# Patient Record
Sex: MALE
[De-identification: names, ages, dates, MRNs, and addresses within clinical notes are randomized per-mention and may not be internally consistent; named-entity substitution may affect disease eponyms.]

---

## 2017-04-01 ENCOUNTER — HOSPITAL ENCOUNTER (EMERGENCY)
Dept: HOSPITAL 14 - H.ER | Age: 37
Discharge: HOME | End: 2017-04-01
Payer: COMMERCIAL

## 2017-04-01 VITALS — HEART RATE: 64 BPM | DIASTOLIC BLOOD PRESSURE: 90 MMHG | RESPIRATION RATE: 18 BRPM | SYSTOLIC BLOOD PRESSURE: 119 MMHG

## 2017-04-01 VITALS — OXYGEN SATURATION: 100 %

## 2017-04-01 VITALS — TEMPERATURE: 98.7 F

## 2017-04-01 DIAGNOSIS — R00.2: Primary | ICD-10-CM

## 2017-04-01 LAB
ALBUMIN/GLOB SERPL: 1.1 {RATIO} (ref 1–2.1)
ALP SERPL-CCNC: 97 U/L (ref 38–126)
ALT SERPL-CCNC: 50 U/L (ref 21–72)
AST SERPL-CCNC: 35 U/L (ref 17–59)
BASOPHILS # BLD AUTO: 0.1 K/UL (ref 0–0.2)
BASOPHILS NFR BLD: 0.8 % (ref 0–2)
BILIRUB SERPL-MCNC: 0.7 MG/DL (ref 0.2–1.3)
BUN SERPL-MCNC: 13 MG/DL (ref 9–20)
CALCIUM SERPL-MCNC: 9.4 MG/DL (ref 8.4–10.2)
CHLORIDE SERPL-SCNC: 105 MMOL/L (ref 98–107)
CO2 SERPL-SCNC: 25 MMOL/L (ref 22–30)
EOSINOPHIL # BLD AUTO: 0.2 K/UL (ref 0–0.7)
EOSINOPHIL NFR BLD: 2.2 % (ref 0–4)
ERYTHROCYTE [DISTWIDTH] IN BLOOD BY AUTOMATED COUNT: 13.9 % (ref 11.5–14.5)
ETHANOL SERPL-MCNC: < 10 MG/DL (ref 0–10)
GLOBULIN SER-MCNC: 3.9 GM/DL (ref 2.2–3.9)
GLUCOSE SERPL-MCNC: 94 MG/DL (ref 75–110)
HCT VFR BLD CALC: 50.8 % (ref 35–51)
LYMPHOCYTES # BLD AUTO: 3.6 K/UL (ref 1–4.3)
LYMPHOCYTES NFR BLD AUTO: 32.3 % (ref 20–40)
MCH RBC QN AUTO: 29.8 PG (ref 27–31)
MCHC RBC AUTO-ENTMCNC: 32.9 G/DL (ref 33–37)
MCV RBC AUTO: 90.7 FL (ref 80–94)
MONOCYTES # BLD: 0.9 K/UL (ref 0–0.8)
MONOCYTES NFR BLD: 8 % (ref 0–10)
NEUTROPHILS # BLD: 6.3 K/UL (ref 1.8–7)
NEUTROPHILS NFR BLD AUTO: 56.7 % (ref 50–75)
NRBC BLD AUTO-RTO: 0.1 % (ref 0–0)
PLATELET # BLD: 276 K/UL (ref 130–400)
PMV BLD AUTO: 8.9 FL (ref 7.2–11.7)
POTASSIUM SERPL-SCNC: 3.9 MMOL/L (ref 3.6–5)
PROT SERPL-MCNC: 8.5 G/DL (ref 6.3–8.2)
SODIUM SERPL-SCNC: 145 MMOL/L (ref 132–148)
WBC # BLD AUTO: 11 K/UL (ref 4.8–10.8)

## 2017-04-01 NOTE — CARD
--------------- APPROVED REPORT --------------





EKG Measurement

Heart [id number]

NM 142P18

[id number]

[id number]

[id number]



<Conclusion>

Normal sinus rhythm

Normal ECG

## 2017-04-01 NOTE — ED PDOC
- Laboratory Results


Result Diagrams: 


 04/01/17 13:55





 04/01/17 13:55





- ECG


O2 Sat by Pulse Oximetry: 100 (RA)


Pulse Ox Interpretation: Normal





Medical Decision Making


Medical Decision Making: 


Time: 15:00


--Patient is pending ER work up, reassessment, and final disposition. 





330


On reeval pt stable. Labs unremarkable. DW pt findings and plan of care. Avoid 

all types of energy drinks and excessive alcohol/caffeine. F/U clinic.





Disposition


Counseled Patient/Family Regarding: Studies Performed, Diagnosis, Need For 

Followup





- Clinical Impression


Clinical Impression: 


 Palpitations





- POA


Present On Arrival: None





- Disposition


Referrals: 


 Service [Outside]


Disposition: Routine/Home


Disposition Time: 15:30


Condition: STABLE


Additional Instructions: 


VISIT YOUR DOCTOR IN 2-3 DAYS FOR REEVALUATION





DO NOT DRINK ANY ENERGY DRINKS. AVOID EXCESS CAFFEINE OR ALCOHOL.


Instructions:  Palpitations (ED)


Print Language: Chinese

## 2017-04-01 NOTE — ED PDOC
HPI: SOB/CHF/COPD


Time Seen by Provider: 04/01/17 13:30


Chief Complaint (Nursing): Shortness Of Breath


Chief Complaint (Provider): Shortness Of Breath


History Per: Patient


History/Exam Limitations: no limitations


Onset/Duration Of Symptoms: Days (x2 weeks)


Current Symptoms Are (Timing): Still Present


Additional Complaint(s): 


37 y/o male who presents to the emergency department with a complaint 

palpitations x2 weeks after drinking redbull. Associated with shortness of 

breath. Denies nausea, vomiting, diarrhea, vision changes, dizziness, abdominal 

pain, or leg pain.  No chest pain, dizziness, headaches, drugs, calf pain, fever

, long distance travel, weakness.  Has had similar in the past for a long time 

frequently. 





Past Medical History


Reviewed: Historical Data, Nursing Documentation, Vital Signs


Vital Signs: 


 Last Vital Signs











Temp  98.7 F   04/01/17 13:22


 


Pulse  69   04/01/17 13:22


 


Resp  16   04/01/17 13:41


 


BP  136/99 H  04/01/17 13:22


 


Pulse Ox  100   04/01/17 13:49














- Medical History


PMH: No Chronic Diseases





- Surgical History


Surgical History: No Surg Hx





- Family History


Family History: States: Unknown Family Hx





- Living Arrangements


Living Arrangements: With Family





- Social History


Current smoker - smoking cessation education provided: No


Alcohol: Social


Drugs: Denies





- Allergies


Allergies/Adverse Reactions: 


 Allergies











Allergy/AdvReac Type Severity Reaction Status Date / Time


 


No Known Allergies Allergy   Verified 04/01/17 13:22














Review of Systems


ROS Statement: Except As Marked, All Systems Reviewed And Found Negative


Eyes: Negative for: Vision Change


Cardiovascular: Positive for: Palpitations


Respiratory: Positive for: Shortness of Breath


Gastrointestinal: Negative for: Nausea, Vomiting, Abdominal Pain, Diarrhea


Musculoskeletal: Negative for: Leg Pain


Neurological: Negative for: Headache, Dizziness





Physical Exam





- Reviewed


Nursing Documentation Reviewed: Yes


Vital Signs Reviewed: Yes





- Physical Exam


Appears: Positive for: Non-toxic, No Acute Distress


Head Exam: Positive for: ATRAUMATIC, NORMOCEPHALIC


Skin: Positive for: Normal Color, Warm, Dry


Eye Exam: Positive for: Normal appearance.  Negative for: Conjunctival injection


ENT: Positive for: Normal ENT Inspection.  Negative for: Nasal Congestion


Neck: Positive for: Normal, Painless ROM, Supple


Cardiovascular/Chest: Positive for: Regular Rate, Rhythm.  Negative for: Chest 

Non Tender, Edema, Murmur


Respiratory: Positive for: Normal Breath Sounds.  Negative for: Accessory 

Muscle Use, Respiratory Distress


Gastrointestinal/Abdominal: Positive for: Normal Exam, Soft.  Negative for: 

Tenderness


Back: Positive for: Normal Inspection.  Negative for: L CVA Tenderness, R CVA 

Tenderness


Extremity: Positive for: Normal ROM.  Negative for: Tenderness, Pedal Edema, 

Swelling


Neurologic/Psych: Positive for: Alert, CNs II-XII, Oriented.  Negative for: 

Motor/Sensory Deficits





- Laboratory Results


Result Diagrams: 


 04/01/17 13:55








- ECG


ECG: Positive for: Interpreted By Me, Viewed By Me


ECG Rhythm: Positive for: Normal QRS, Normal ST Segment, Sinus Rhythm


O2 Sat by Pulse Oximetry: 100 (RA)


Pulse Ox Interpretation: Normal





- Progress


ED Course And Treament: 


1449:  Stable.  Dr. Calle to fu on labs.  AAOx3.





Medical Decision Making


Medical Decision Making: 


Time: 13:30


Initial impression: Shortness of breath





Initial plan:


--Electrocardiogram Stat


--Alcohol Serum Stat


--COMP Metabolic Panel


--Drug Screen, Urine Stat


--Troponin I stat


--EKG-ED (EDNURTX)


--CBC w/ differential


--Sodium Chloride 1,000 ml IV 1,000 mls/hr


--Revaluation








Scribe Attestation:


Documented by Giselle Pruitt, acting as a scribe for Jhonatan Ferrer MD.





Provider Scribe Attestation:


All medical record entries made by the Scribe were at my direction and 

personally dictated by me. I have reviewed the chart and agree that the record 

accurately reflects my personal performance of the history, physical exam, 

medical decision making, and the department course for this patient. I have 

also personally directed, reviewed, and agree with the discharge instructions 

and disposition.





Disposition





- Clinical Impression


Clinical Impression: 


 Palpitations





- Patient ED Disposition


Is Patient to be Admitted: Transfer of Care





- Disposition


Disposition: Transfer of Care


Disposition Time: 14:51


Condition: STABLE


Patient Signed Over To: Sindhu Calle


Present On Arrival: None

## 2018-09-30 ENCOUNTER — HOSPITAL ENCOUNTER (EMERGENCY)
Dept: HOSPITAL 14 - H.ER | Age: 38
Discharge: HOME | End: 2018-09-30
Payer: COMMERCIAL

## 2018-09-30 VITALS — SYSTOLIC BLOOD PRESSURE: 130 MMHG | TEMPERATURE: 97.6 F | DIASTOLIC BLOOD PRESSURE: 78 MMHG | HEART RATE: 78 BPM

## 2018-09-30 VITALS — OXYGEN SATURATION: 98 %

## 2018-09-30 VITALS — RESPIRATION RATE: 19 BRPM

## 2018-09-30 DIAGNOSIS — R00.2: Primary | ICD-10-CM

## 2018-09-30 LAB
APTT BLD: 29.5 SECONDS (ref 25.6–37.1)
BASOPHILS # BLD AUTO: 0.1 K/UL (ref 0–0.2)
BASOPHILS NFR BLD: 0.7 % (ref 0–2)
BUN SERPL-MCNC: 14 MG/DL (ref 9–20)
CALCIUM SERPL-MCNC: 9.5 MG/DL (ref 8.4–10.2)
EOSINOPHIL # BLD AUTO: 0.1 K/UL (ref 0–0.7)
EOSINOPHIL NFR BLD: 0.6 % (ref 0–4)
ERYTHROCYTE [DISTWIDTH] IN BLOOD BY AUTOMATED COUNT: 13.9 % (ref 11.5–14.5)
GFR NON-AFRICAN AMERICAN: > 60
HGB BLD-MCNC: 16.6 G/DL (ref 12–18)
INR PPP: 1.1
LYMPHOCYTES # BLD AUTO: 2.4 K/UL (ref 1–4.3)
LYMPHOCYTES NFR BLD AUTO: 21.3 % (ref 20–40)
MCH RBC QN AUTO: 30.4 PG (ref 27–31)
MCHC RBC AUTO-ENTMCNC: 33.7 G/DL (ref 33–37)
MCV RBC AUTO: 90.3 FL (ref 80–94)
MONOCYTES # BLD: 1 K/UL (ref 0–0.8)
MONOCYTES NFR BLD: 8.7 % (ref 0–10)
NEUTROPHILS # BLD: 7.8 K/UL (ref 1.8–7)
NEUTROPHILS NFR BLD AUTO: 68.7 % (ref 50–75)
NRBC BLD AUTO-RTO: 0.1 % (ref 0–0)
PLATELET # BLD: 312 K/UL (ref 130–400)
PMV BLD AUTO: 9.3 FL (ref 7.2–11.7)
PROTHROMBIN TIME: 12.4 SECONDS (ref 9.8–13.1)
RBC # BLD AUTO: 5.47 MIL/UL (ref 4.4–5.9)
WBC # BLD AUTO: 11.3 K/UL (ref 4.8–10.8)

## 2018-09-30 PROCEDURE — 99285 EMERGENCY DEPT VISIT HI MDM: CPT

## 2018-09-30 PROCEDURE — 80324 DRUG SCREEN AMPHETAMINES 1/2: CPT

## 2018-09-30 PROCEDURE — 80345 DRUG SCREENING BARBITURATES: CPT

## 2018-09-30 PROCEDURE — 80346 BENZODIAZEPINES1-12: CPT

## 2018-09-30 PROCEDURE — 71046 X-RAY EXAM CHEST 2 VIEWS: CPT

## 2018-09-30 PROCEDURE — 93005 ELECTROCARDIOGRAM TRACING: CPT

## 2018-09-30 PROCEDURE — 86900 BLOOD TYPING SEROLOGIC ABO: CPT

## 2018-09-30 PROCEDURE — 85730 THROMBOPLASTIN TIME PARTIAL: CPT

## 2018-09-30 PROCEDURE — 85610 PROTHROMBIN TIME: CPT

## 2018-09-30 PROCEDURE — 80353 DRUG SCREENING COCAINE: CPT

## 2018-09-30 PROCEDURE — 80358 DRUG SCREENING METHADONE: CPT

## 2018-09-30 PROCEDURE — 80349 CANNABINOIDS NATURAL: CPT

## 2018-09-30 PROCEDURE — 86850 RBC ANTIBODY SCREEN: CPT

## 2018-09-30 PROCEDURE — 96374 THER/PROPH/DIAG INJ IV PUSH: CPT

## 2018-09-30 PROCEDURE — 84484 ASSAY OF TROPONIN QUANT: CPT

## 2018-09-30 PROCEDURE — 80320 DRUG SCREEN QUANTALCOHOLS: CPT

## 2018-09-30 PROCEDURE — 96375 TX/PRO/DX INJ NEW DRUG ADDON: CPT

## 2018-09-30 PROCEDURE — 85025 COMPLETE CBC W/AUTO DIFF WBC: CPT

## 2018-09-30 PROCEDURE — 83992 ASSAY FOR PHENCYCLIDINE: CPT

## 2018-09-30 PROCEDURE — 71275 CT ANGIOGRAPHY CHEST: CPT

## 2018-09-30 PROCEDURE — 80361 OPIATES 1 OR MORE: CPT

## 2018-09-30 PROCEDURE — 80048 BASIC METABOLIC PNL TOTAL CA: CPT

## 2018-09-30 NOTE — CT
Date of service: 



09/30/2018



PROCEDURE:  CT Chest with contrast (Pulmonary Angiogram)



HISTORY:

chest pain



COMPARISON:

None available.



TECHNIQUE:

Axial computed tomography images were obtained of the chest in the 

pulmonary arterial phase of enhancement. Coronal and sagittal 

reformatted images were created and reviewed.



Intravenous contrast dose: 98 mL Visipaque 320



Radiation dose:



Total exam DLP = 347.2 mGy-cm.



This CT exam was performed using one or more of the following dose 

reduction techniques: Automated exposure control, adjustment of the 

mA and/or kV according to patient size, and/or use of iterative 

reconstruction technique.



FINDINGS:



PULMONARY ARTERIES:

Unremarkable. No pulmonary embolism. 



AORTA:

No acute findings. No thoracic aortic aneurysm. 



LUNGS:

Unremarkable. No nodule, mass or pulmonary consolidation. 



PLEURAL SPACES:

Unremarkable. No effusion or pneumothorax. 



HEART:

Unremarkable. No cardiomegaly. No significant pericardial effusion. 



LYMPH NODES:

No lymphadenopathy.



BONES, CHEST WALL:

Unremarkable. No fracture or destructive lesion 



OTHER FINDINGS:

Unremarkable. 



IMPRESSION:

Unremarkable CT pulmonary angiogram. No pulmonary embolus.

## 2018-09-30 NOTE — ED PDOC
- Laboratory Results


Result Diagrams: 


                                 09/30/18 06:41





                                 09/30/18 06:41





- ECG


O2 Sat by Pulse Oximetry: 98 (RA)





Medical Decision Making


Medical Decision Making: 





recd 7am pending labs


38yo M with palpitations and chest tightness.


EKG reveals sinus with ectopy and frequent bigeminy. 








Accession No. : [id number]


Patient Name / ID : SAQIB BERNAL  / [id number]


Exam Date : 09/30/2018 14:57:07 ( Approved )


Study Comment : 


Sex / Age : M  / 37Y





Creator : Tiago Lopez MD


Dictator : Tiago Lopez MD


 : 


Approver : Tiago Lopez MD


Approver2 : 





Report Date : 09/30/2018 15:42:30


My Comment : 


 

********************************************************************************


***





This report is currently processing and HAS NOT BEEN OFFICIALLY SIGNED BY THE 

PHYSICIAN - ESTIMATED TIME OF APPROVAL IS 09/30/2018 15:51. 





Date of service: 





09/30/2018





PROCEDURE:  CT Chest with contrast (Pulmonary Angiogram)





HISTORY:


chest pain





COMPARISON:


None available.





TECHNIQUE:


Axial computed tomography images were obtained of the chest in the pulmonary 

arterial phase of enhancement. Coronal and sagittal reformatted images were 

created and reviewed.





Intravenous contrast dose: 98 mL Visipaque 320





Radiation dose:





Total exam DLP = 347.2 mGy-cm.





This CT exam was performed using one or more of the following dose reduction 

techniques: Automated exposure control, adjustment of the mA and/or kV according

to patient size, and/or use of iterative reconstruction technique.





FINDINGS:





PULMONARY ARTERIES:


Unremarkable. No pulmonary embolism. 





AORTA:


No acute findings. No thoracic aortic aneurysm. 





LUNGS:


Unremarkable. No nodule, mass or pulmonary consolidation. 





PLEURAL SPACES:


Unremarkable. No effusion or pneumothorax. 





HEART:


Unremarkable. No cardiomegaly. No significant pericardial effusion. 





LYMPH NODES:


No lymphadenopathy.





BONES, CHEST WALL:


Unremarkable. No fracture or destructive lesion 





OTHER FINDINGS:


Unremarkable. 





IMPRESSION:


Unremarkable CT pulmonary angiogram. No pulmonary embolus.








repeat ekg #3 sinus with APCs, trace ST elev V3 but consistent w 2017 EKG


No chest pain.


Symptoms ongoing 3 weeks


HEART score 0





DC w metoprolol 25mg BID, refer to cardio


Drug avoidance








Disposition


Counseled Patient/Family Regarding: Studies Performed, Diagnosis, Need For 

Followup





- Clinical Impression


Clinical Impression: 


 Palpitations








- POA


Present On Arrival: None





- Disposition


Referrals: 


Jamison Mims MD [Staff Provider] - 


Disposition: Routine/Home


Disposition Time: 15:59


Condition: STABLE


Additional Instructions: 


See cardiologist for further testing. 


Return to ER for any new or worsening symptoms.


Take metoprolol 25mg 2x daily.


Avoid all drug use.








Prescriptions: 


Metoprolol Tartrate 25 mg PO BID #10 tablet


Instructions:  Palpitations (DC)


Forms:  [facility] (English)

## 2018-09-30 NOTE — CARD
--------------- APPROVED REPORT --------------





Date of service: 09/30/2018



EKG Measurement

Heart [id number]

MD 232P12

[id number]

[id number]

[id number]



<Conclusion>

Sinus rhythm with 1st degree AV block with premature atrial complexes 

in a pattern of bigeminy

Otherwise normal ECG

## 2018-09-30 NOTE — RAD
Date of service: 



09/30/2018



HISTORY:

 palpitations 



COMPARISON:

No prior.



TECHNIQUE:

Chest PA and lateral



FINDINGS:



LUNGS:

No active pulmonary disease.



PLEURA:

No significant pleural effusion identified. No pneumothorax apparent.



CARDIOVASCULAR:

Normal.



OSSEOUS STRUCTURES:

No significant abnormalities.



VISUALIZED UPPER ABDOMEN:

Normal.



OTHER FINDINGS:

None.



IMPRESSION:

No active disease.

## 2018-09-30 NOTE — ED PDOC
HPI: Chest Pain


Time Seen by Provider: 09/30/18 06:28


Chief Complaint (Nursing): Chest Pain


Chief Complaint (Provider): Palpitations


History Per: Patient


History/Exam Limitations: no limitations


Onset/Duration Of Symptoms: Days (x2 weeks)


Current Symptoms Are (Timing): Intermittent Episodes


Additional Complaint(s): 


37 year old male with no pmhx presents to the ED for evaluation of intermittent 

palpitations for the last two weeks. He states they occur when he is at rest or 

with exercise, and there is no pain, but he becomes more aware of it when it is 

irregular. Additionally, he reports finding it difficult to catch his breath 

when his heart begins beating fast. Otherwise, denies drug use, excessive 

caffeine or energy drinks, or recent illnesses. 





PMD: none provided





Past Medical History


Reviewed: Historical Data, Nursing Documentation, Vital Signs


Vital Signs: 





                                Last Vital Signs











Temp  97.7 F   09/30/18 05:48


 


Pulse  78   09/30/18 05:59


 


Resp  20   09/30/18 05:48


 


BP  135/85   09/30/18 05:59


 


Pulse Ox  98   09/30/18 05:48














- Medical History


PMH: No Chronic Diseases





- Surgical History


Surgical History: No Surg Hx





- Family History


Family History: States: Hypertension





- Social History


Ex-Smoker (has not smoked in the last 12 months): Yes


Drugs: Denies





- Home Medications


Home Medications: 


                                Ambulatory Orders











 Medication  Instructions  Recorded


 


Metoprolol Tartrate 25 mg PO BID #10 tablet 09/30/18














- Allergies


Allergies/Adverse Reactions: 


                                    Allergies











Allergy/AdvReac Type Severity Reaction Status Date / Time


 


No Known Allergies Allergy   Verified 09/30/18 05:50














Review of Systems


ROS Statement: Except As Marked, All Systems Reviewed And Found Negative


Cardiovascular: Positive for: Palpitations





Physical Exam





- Reviewed


Nursing Documentation Reviewed: Yes


Vital Signs Reviewed: Yes





- Physical Exam


Appears: Positive for: No Acute Distress


Head Exam: Positive for: ATRAUMATIC, NORMOCEPHALIC


Skin: Positive for: Normal Color, Warm, Dry.  Negative for: Rash


Eye Exam: Positive for: Normal appearance


ENT: Positive for: Normal ENT Inspection


Neck: Positive for: Normal, Painless ROM, Supple


Cardiovascular/Chest: Positive for: Other (regular irregular heartbeat)


Respiratory: Positive for: Normal Breath Sounds.  Negative for: Accessory Muscle

Use, Respiratory Distress


Gastrointestinal/Abdominal: Positive for: Normal Exam, Soft.  Negative for: 

Tenderness


Back: Positive for: Normal Inspection


Extremity: Positive for: Normal ROM


Neurologic/Psych: Positive for: Alert, Oriented (x3).  Negative for: 

Motor/Sensory Deficits





- Laboratory Results


Result Diagrams: 


                                 09/30/18 06:41





                                 09/30/18 06:41





- ECG


O2 Sat by Pulse Oximetry: 98 (RA)


Pulse Ox Interpretation: Normal





Medical Decision Making


Medical Decision Making: 





A/P: 37 year old male presenting with palpitations


--very well appearing however appears to be in bigeminy at this time


--placed on a cardiac monitor


Time: 0643


Initial Plan:


--Type and screen


--EKG


--Alcohol serum


--BMP


--U-dip


--Trop I


--CBC with differential


--PT / PTT





0700 Pt care is endorsed from writer to Dr. Collazo pending labs and reevaluati

on.





 

--------------------------------------------------------------------------------


-----------------


Scribe Attestation:


Documented by Mely Ferrari, acting as a scribe for Black Hyde MD.





Provider Scribe Attestation:


All medical record entries made by the Scribe were at my direction and persona

lly dictated by me. I have reviewed the chart and agree that the record 

accurately reflects my personal performance of the history, physical exam, 

medical decision making, and the department course for this patient. I have also

personally directed, reviewed, and agree with the discharge instructions and 

disposition.





Disposition





- Clinical Impression


Clinical Impression: 


 Palpitations








- Disposition


Referrals: 


Jamison Mims MD [Staff Provider] - 


Disposition Time: 07:00


Condition: STABLE


Additional Instructions: 


See cardiologist for further testing. 


Return to ER for any new or worsening symptoms.


Take metoprolol 25mg 2x daily.


Avoid all drug use.








Prescriptions: 


Metoprolol Tartrate 25 mg PO BID #10 tablet


Instructions:  Palpitations (DC)


Forms:  [facility] (English), [facility] ED School/Work Excuse

## 2018-10-01 NOTE — CARD
--------------- APPROVED REPORT --------------





Date of service: 09/30/2018



EKG Measurement

Heart [id number]

ME P5

[id number]

[id number]

[id number]



<Conclusion>

Sinus rhythm with frequent premature atrial complexes

Abnormal ECG

## 2018-10-01 NOTE — CARD
--------------- APPROVED REPORT --------------





Date of service: 09/30/2018



EKG Measurement

Heart [id number]

IL 192P44

[id number]

[id number]

[id number]



<Conclusion>

Sinus rhythm with premature atrial complexes

Cannot rule out Anterior infarct, age undetermined

Abnormal ECG